# Patient Record
Sex: FEMALE | Race: WHITE
[De-identification: names, ages, dates, MRNs, and addresses within clinical notes are randomized per-mention and may not be internally consistent; named-entity substitution may affect disease eponyms.]

---

## 2019-08-09 ENCOUNTER — HOSPITAL ENCOUNTER (OUTPATIENT)
Dept: HOSPITAL 89 - US | Age: 24
End: 2019-08-09
Attending: OBSTETRICS & GYNECOLOGY
Payer: COMMERCIAL

## 2019-08-09 ENCOUNTER — HOSPITAL ENCOUNTER (OUTPATIENT)
Dept: HOSPITAL 89 - LAB | Age: 24
End: 2019-08-09
Attending: OBSTETRICS & GYNECOLOGY
Payer: COMMERCIAL

## 2019-08-09 DIAGNOSIS — Z34.82: Primary | ICD-10-CM

## 2019-08-09 DIAGNOSIS — R10.11: ICD-10-CM

## 2019-08-09 DIAGNOSIS — Z34.82: ICD-10-CM

## 2019-08-09 DIAGNOSIS — Z34.92: Primary | ICD-10-CM

## 2019-08-09 LAB — PLATELET COUNT, AUTOMATED: 215 K/UL (ref 150–450)

## 2019-08-09 PROCEDURE — 84075 ASSAY ALKALINE PHOSPHATASE: CPT

## 2019-08-09 PROCEDURE — 76820 UMBILICAL ARTERY ECHO: CPT

## 2019-08-09 PROCEDURE — 82374 ASSAY BLOOD CARBON DIOXIDE: CPT

## 2019-08-09 PROCEDURE — 84132 ASSAY OF SERUM POTASSIUM: CPT

## 2019-08-09 PROCEDURE — 84155 ASSAY OF PROTEIN SERUM: CPT

## 2019-08-09 PROCEDURE — 76705 ECHO EXAM OF ABDOMEN: CPT

## 2019-08-09 PROCEDURE — 82435 ASSAY OF BLOOD CHLORIDE: CPT

## 2019-08-09 PROCEDURE — 76815 OB US LIMITED FETUS(S): CPT

## 2019-08-09 PROCEDURE — 84460 ALANINE AMINO (ALT) (SGPT): CPT

## 2019-08-09 PROCEDURE — 84450 TRANSFERASE (AST) (SGOT): CPT

## 2019-08-09 PROCEDURE — 82565 ASSAY OF CREATININE: CPT

## 2019-08-09 PROCEDURE — 36415 COLL VENOUS BLD VENIPUNCTURE: CPT

## 2019-08-09 PROCEDURE — 82947 ASSAY GLUCOSE BLOOD QUANT: CPT

## 2019-08-09 PROCEDURE — 84520 ASSAY OF UREA NITROGEN: CPT

## 2019-08-09 PROCEDURE — 82040 ASSAY OF SERUM ALBUMIN: CPT

## 2019-08-09 PROCEDURE — 82247 BILIRUBIN TOTAL: CPT

## 2019-08-09 PROCEDURE — 82310 ASSAY OF CALCIUM: CPT

## 2019-08-09 PROCEDURE — 84295 ASSAY OF SERUM SODIUM: CPT

## 2019-08-09 PROCEDURE — 82950 GLUCOSE TEST: CPT

## 2019-08-09 PROCEDURE — 85025 COMPLETE CBC W/AUTO DIFF WBC: CPT

## 2019-08-09 NOTE — RADIOLOGY IMAGING REPORT
FACILITY: [facility] [place] 

 

PATIENT NAME: Janee Martin

: 1995

MR: [id number]

V: [id number]

EXAM DATE: 

ORDERING PHYSICIAN: GOPAL ROJAS

TECHNOLOGIST: 

 

Location: [facility] [place]

Patient: Janee Martin

: 1995

MRN: [id number]

Visit/Account:[id number]

Date of Sevice:  2019

 

ACCESSION #: [id number]

 

******** ADDENDUM #1 ********

 

Results were called to GOPAL ROJAS at 2019 3:04 PM.

 

Report Dictated By: Willie Stoll MD at 2019 3:03 PM

 

Report E-Signed By: Willie Stoll MD  at 2019 3:04 PM

 

******** ORIGINAL REPORT ********

 

Focused right upper quadrant ultrasound

 

HISTORY: Right upper quadrant pain.

 

COMPARISON: None available.

 

Findings: Standard right upper quadrant abdominal ultrasound is performed.

 

Pancreas: Visualized portions of the pancreas are unremarkable.

 

Liver: 10 x 9 x 8 mm hyperechoic lesion in the right hepatic lobe. Otherwise negative.

 

Gallbladder and biliary system: No gallbladder stone or sludge. No wall thickening, pericholecystic f
luid, or sonographic Ivory's. The common bile duct is not dilated.

 

Aorta and IVC: The visualized aorta and IVC are patent.

 

Kidneys: The right kidney measures 10.7 x 5.4 x 5.0 cm.  Mild right hydronephrosis.

 

Ascites: None.

 

IMPRESSION:

 

1. Mild right hydronephrosis, probably related to the gravid uterus.

 

2. 10 x 9 x 8 mm hyperechoic lesion in the right hepatic lobe. This is most likely a benign hemangiom
a if there is no history of malignancy or chronic liver disease.

 

Report Dictated By: Willie Stoll MD at 2019 2:21 PM

 

Report E-Signed By: Willie Stoll MD  at 2019 2:54 PM

 

WSN:[id number]

## 2019-08-09 NOTE — RADIOLOGY IMAGING REPORT
FACILITY: [facility] [place] 

 

PATIENT NAME: Janee Martin

: 1995

MR: [id number]

V: [id number]

EXAM DATE: 

ORDERING PHYSICIAN: GOPAL ROJAS

TECHNOLOGIST: 

 

Location: [facility] [place]

Patient: Janee Martin

: 1995

MRN: [id number]

Visit/Account:[id number]

Date of Sevice:  2019

 

ACCESSION #: [id number]

 

******** ADDENDUM #1 ********

 

ADDENDUM:

 

Results were called to GOPAL ROJAS at 2019 3:04 PM.

 

Report Dictated By: Willie Stoll MD at 2019 3:04 PM

 

Report E-Signed By: Willie Stoll MD  at 2019 3:04 PM

 

******** ORIGINAL REPORT ********

 

Obstetric ultrasound

 

HISTORY:   Right upper quadrant pain. Pregnant.

 

COMPARISON: None available.

 

FINDINGS:

 

Standard transabdominal obstetric ultrasound. Umbilical artery Doppler evaluation was also performed.


 

Uterus, cervix, and adnexa: Negative.

 

Placenta: Unremarkable posterior placenta. No placenta previa.

 

Fetal anatomic survey: Fetal anatomic survey was not performed.

 

Fetal Parameters:

Biparietal diameter: 6.9 cm, 28 weeks, 0 days, 63rd percentile

Head circumference: 26 cm, 28 weeks, 3 days, 68th percentile

Abdominal circumference: 24 cm, 28 weeks, 2 days, 76th percentile

Femur length: 5 cm, 27 weeks, 1 day, 32nd percentile

 

Composite gestational age based on Hadlock criteria is 28 weeks, 0 days for an estimated delivery trinh
e of 2019.

 

Estimated fetal weight is 1131 g which is at the 66th percentile based on LMP.

 

Fetal heart rate: 147 bpm.

 

Amniotic fluid index (MICHELLE): 20.4 cm. Largest pocket: 5.4 cm.

 

 

Grayscale, duplex and color Doppler interrogation of the fetal umbilical arteries was performed.

 

Umbilical artery #1:

Normal systolic upstroke without diastolic notching.

RI = 0.69 (normal less than or equal 0.7)

S/D = 3.2 (normal 24-27 weeks less than or equal 4.0)

              (normal > 28 weeks less than or equal 3.0)

 

Umbilical artery #2:

Normal systolic upstroke without diastolic notching.

RI = 0.68 (normal less than or equal 0.7)

S/D = 3.2 (normal 24-27 weeks less than or equal 4.0)

              (normal > 28 weeks less than or equal 3.0)

 

IMPRESSION: Live intrauterine pregnancy with no emergent findings.

 

Report Dictated By: Willie Stoll MD at 2019 2:25 PM

 

Report E-Signed By: Willie Stoll MD  at 2019 2:54 PM

 

WSN:[id number]